# Patient Record
Sex: FEMALE
[De-identification: names, ages, dates, MRNs, and addresses within clinical notes are randomized per-mention and may not be internally consistent; named-entity substitution may affect disease eponyms.]

---

## 2023-08-01 ENCOUNTER — DASHBOARD ENCOUNTERS (OUTPATIENT)
Age: 38
End: 2023-08-01

## 2023-08-01 ENCOUNTER — WEB ENCOUNTER (OUTPATIENT)
Dept: URBAN - NONMETROPOLITAN AREA CLINIC 2 | Facility: CLINIC | Age: 38
End: 2023-08-01

## 2023-08-01 ENCOUNTER — OFFICE VISIT (OUTPATIENT)
Dept: URBAN - NONMETROPOLITAN AREA CLINIC 2 | Facility: CLINIC | Age: 38
End: 2023-08-01
Payer: MEDICAID

## 2023-08-01 VITALS — HEIGHT: 62 IN | TEMPERATURE: 97.8 F | BODY MASS INDEX: 35.88 KG/M2 | WEIGHT: 195 LBS

## 2023-08-01 DIAGNOSIS — I10 PRIMARY HYPERTENSION: ICD-10-CM

## 2023-08-01 DIAGNOSIS — R10.2 PELVIC PAIN: ICD-10-CM

## 2023-08-01 DIAGNOSIS — R19.5 LOOSE STOOLS: ICD-10-CM

## 2023-08-01 PROBLEM — 59621000: Status: ACTIVE | Noted: 2023-08-01

## 2023-08-01 PROCEDURE — 99204 OFFICE O/P NEW MOD 45 MIN: CPT | Performed by: NURSE PRACTITIONER

## 2023-08-01 RX ORDER — DICYCLOMINE HYDROCHLORIDE 10 MG/1
1 CAPSULES CAPSULE ORAL
Qty: 60 | Refills: 6 | OUTPATIENT
Start: 2023-08-01 | End: 2024-02-26

## 2023-08-01 NOTE — HPI-TODAY'S VISIT:
Patient is a 38-year-old female with a past medical history of uncontrolled hypertension as well as carpal tunnel who states her chief complaint today is left wrist pain and numbness.  She has been seen in the ER about this and was asked referred to urgent Ortho.  Upon chart review, there is an old referral in there from GYN.  She does have some known uterine fibroids that cause right pelvic pain that she takes Tylenol with relief for and has had imaging of this.  Upon probing patient further, she does report she does have 5 bowel movements on average daily with some nocturnal complaints.  No additional GI complaints.  She is not on any anticoagulation.   Sb

## 2023-11-01 ENCOUNTER — OFFICE VISIT (OUTPATIENT)
Dept: URBAN - NONMETROPOLITAN AREA CLINIC 2 | Facility: CLINIC | Age: 38
End: 2023-11-01

## 2023-12-13 ENCOUNTER — OFFICE VISIT (OUTPATIENT)
Dept: URBAN - NONMETROPOLITAN AREA CLINIC 2 | Facility: CLINIC | Age: 38
End: 2023-12-13